# Patient Record
Sex: MALE | Race: WHITE | NOT HISPANIC OR LATINO | URBAN - NONMETROPOLITAN AREA
[De-identification: names, ages, dates, MRNs, and addresses within clinical notes are randomized per-mention and may not be internally consistent; named-entity substitution may affect disease eponyms.]

---

## 2020-06-17 ENCOUNTER — OFFICE VISIT (OUTPATIENT)
Dept: URBAN - NONMETROPOLITAN AREA CLINIC 1 | Facility: CLINIC | Age: 40
End: 2020-06-17
Payer: COMMERCIAL

## 2020-06-17 DIAGNOSIS — K50.80 CROHN'S COLITIS: ICD-10-CM

## 2020-06-17 PROCEDURE — 96413 CHEMO IV INFUSION 1 HR: CPT | Performed by: INTERNAL MEDICINE

## 2020-06-17 RX ORDER — SACCHAROMYCES BOULARDII 250 MG
CAPSULE ORAL
Qty: 0 | Refills: 0 | Status: ACTIVE | COMMUNITY
Start: 1900-01-01 | End: 1900-01-01

## 2020-06-17 RX ORDER — LISDEXAMFETAMINE DIMESYLATE 30 MG/1
CAPSULE ORAL
Qty: 0 | Refills: 0 | Status: ACTIVE | COMMUNITY
Start: 1900-01-01 | End: 1900-01-01

## 2020-06-17 RX ORDER — VILAZODONE HYDROCHLORIDE 40 MG/1
TAKE 1 TABLET (40 MG) BY ORAL ROUTE ONCE DAILY WITH FOOD TABLET ORAL 1
Qty: 0 | Refills: 0 | Status: ACTIVE | COMMUNITY
Start: 1900-01-01 | End: 1900-01-01

## 2020-08-04 ENCOUNTER — OFFICE VISIT (OUTPATIENT)
Dept: URBAN - NONMETROPOLITAN AREA CLINIC 2 | Facility: CLINIC | Age: 40
End: 2020-08-04

## 2020-08-06 ENCOUNTER — TELEPHONE ENCOUNTER (OUTPATIENT)
Dept: URBAN - NONMETROPOLITAN AREA CLINIC 2 | Facility: CLINIC | Age: 40
End: 2020-08-06

## 2020-08-06 ENCOUNTER — OFFICE VISIT (OUTPATIENT)
Dept: URBAN - METROPOLITAN AREA TELEHEALTH 2 | Facility: TELEHEALTH | Age: 40
End: 2020-08-06
Payer: COMMERCIAL

## 2020-08-06 DIAGNOSIS — K94.13 ILEOSTOMY DYSFUNCTION: ICD-10-CM

## 2020-08-06 DIAGNOSIS — K50.80 CROHN'S DISEASE OF BOTH SMALL AND LARGE INTESTINE WITHOUT COMPLICATION: ICD-10-CM

## 2020-08-06 PROCEDURE — 82306 VITAMIN D 25 HYDROXY: CPT | Performed by: NURSE PRACTITIONER

## 2020-08-06 PROCEDURE — G8427 DOCREV CUR MEDS BY ELIG CLIN: HCPCS | Performed by: NURSE PRACTITIONER

## 2020-08-06 PROCEDURE — 1036F TOBACCO NON-USER: CPT | Performed by: NURSE PRACTITIONER

## 2020-08-06 PROCEDURE — G9903 PT SCRN TBCO ID AS NON USER: HCPCS | Performed by: NURSE PRACTITIONER

## 2020-08-06 PROCEDURE — 82607 VITAMIN B-12: CPT | Performed by: NURSE PRACTITIONER

## 2020-08-06 PROCEDURE — 99213 OFFICE O/P EST LOW 20 MIN: CPT | Performed by: NURSE PRACTITIONER

## 2020-08-06 PROCEDURE — G8420 CALC BMI NORM PARAMETERS: HCPCS | Performed by: NURSE PRACTITIONER

## 2020-08-06 RX ORDER — LISDEXAMFETAMINE DIMESYLATE 30 MG/1
CAPSULE ORAL
Qty: 0 | Refills: 0 | Status: ACTIVE | COMMUNITY
Start: 1900-01-01

## 2020-08-06 RX ORDER — VILAZODONE HYDROCHLORIDE 40 MG/1
TAKE 1 TABLET (40 MG) BY ORAL ROUTE ONCE DAILY WITH FOOD TABLET ORAL 1
Qty: 0 | Refills: 0 | Status: ACTIVE | COMMUNITY
Start: 1900-01-01

## 2020-08-06 RX ORDER — NYSTATIN 100000 [USP'U]/G
1 APPLICATION POWDER TOPICAL TWICE A DAY
Qty: 480 GM | Refills: 2 | OUTPATIENT
Start: 2020-08-06 | End: 2020-09-17

## 2020-08-06 RX ORDER — SACCHAROMYCES BOULARDII 250 MG
CAPSULE ORAL
Qty: 0 | Refills: 0 | Status: ACTIVE | COMMUNITY
Start: 1900-01-01

## 2020-08-06 NOTE — HPI-TODAY'S VISIT:
8/5/2020 Kwesi presents for follow up of Crohn's disease s/p colectomy with end ileostomy. His output is good with no bleeding or pain. He is tolerating his entivyo Q 8 weeks. 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 Dr. Jurado has recommended a binder. He is loosing seal on his ostomy and has a rash at the stoma. He is due for lab work. Today he is doing well. MB   2/4/20 Mr. Finch presents for f/u of his Crohn's disease.  he is maintained on Entyvio and doing well.  His only complaint today is that his ostomy seems to be herniating again.  he has not followed up with Dr. Jurado.  He has not had any flares of his Crohn's.  OVerall, he is doing well today.  9/16/19 Kwesi presents for follow up of Crohn's disease s/p colectomy with end ilostomy. He recently fell while skating and developed a parastomal hernia. He had to have emergency surgery by Dr. Jurado. He is doing well postop with no complications, good output. He did loose weight during this and would like to meet with jian. Today he is doing well otherwise with no new GI complaints. MB

## 2020-08-19 ENCOUNTER — OFFICE VISIT (OUTPATIENT)
Dept: URBAN - NONMETROPOLITAN AREA CLINIC 1 | Facility: CLINIC | Age: 40
End: 2020-08-19
Payer: COMMERCIAL

## 2020-08-19 DIAGNOSIS — K50.80 CROHN'S COLITIS: ICD-10-CM

## 2020-08-19 PROCEDURE — 96413 CHEMO IV INFUSION 1 HR: CPT | Performed by: INTERNAL MEDICINE

## 2020-08-19 RX ORDER — VILAZODONE HYDROCHLORIDE 40 MG/1
TAKE 1 TABLET (40 MG) BY ORAL ROUTE ONCE DAILY WITH FOOD TABLET ORAL 1
Qty: 0 | Refills: 0 | Status: ACTIVE | COMMUNITY
Start: 1900-01-01

## 2020-08-19 RX ORDER — NYSTATIN 100000 [USP'U]/G
1 APPLICATION POWDER TOPICAL TWICE A DAY
Qty: 480 GM | Refills: 2 | Status: ACTIVE | COMMUNITY
Start: 2020-08-06 | End: 2020-09-17

## 2020-08-19 RX ORDER — LISDEXAMFETAMINE DIMESYLATE 30 MG/1
CAPSULE ORAL
Qty: 0 | Refills: 0 | Status: ACTIVE | COMMUNITY
Start: 1900-01-01

## 2020-08-19 RX ORDER — SACCHAROMYCES BOULARDII 250 MG
CAPSULE ORAL
Qty: 0 | Refills: 0 | Status: ACTIVE | COMMUNITY
Start: 1900-01-01

## 2020-09-15 ENCOUNTER — TELEPHONE ENCOUNTER (OUTPATIENT)
Dept: URBAN - NONMETROPOLITAN AREA CLINIC 1 | Facility: CLINIC | Age: 40
End: 2020-09-15

## 2020-09-15 RX ORDER — VEDOLIZUMAB 300 MG/5ML
AS DIRECTED INJECTION, POWDER, LYOPHILIZED, FOR SOLUTION INTRAVENOUS
Qty: 1 | Refills: 0 | OUTPATIENT
Start: 2020-09-17 | End: 2020-09-18

## 2020-09-22 ENCOUNTER — LAB OUTSIDE AN ENCOUNTER (OUTPATIENT)
Dept: URBAN - NONMETROPOLITAN AREA CLINIC 1 | Facility: CLINIC | Age: 40
End: 2020-09-22

## 2020-10-07 ENCOUNTER — OFFICE VISIT (OUTPATIENT)
Dept: URBAN - NONMETROPOLITAN AREA CLINIC 1 | Facility: CLINIC | Age: 40
End: 2020-10-07
Payer: COMMERCIAL

## 2020-10-07 DIAGNOSIS — K50.80 CROHN'S COLITIS: ICD-10-CM

## 2020-10-07 PROCEDURE — 96413 CHEMO IV INFUSION 1 HR: CPT | Performed by: INTERNAL MEDICINE

## 2020-10-07 RX ORDER — VILAZODONE HYDROCHLORIDE 40 MG/1
TAKE 1 TABLET (40 MG) BY ORAL ROUTE ONCE DAILY WITH FOOD TABLET ORAL 1
Qty: 0 | Refills: 0 | Status: ACTIVE | COMMUNITY
Start: 1900-01-01

## 2020-10-07 RX ORDER — SACCHAROMYCES BOULARDII 250 MG
CAPSULE ORAL
Qty: 0 | Refills: 0 | Status: ACTIVE | COMMUNITY
Start: 1900-01-01

## 2020-10-07 RX ORDER — LISDEXAMFETAMINE DIMESYLATE 30 MG/1
CAPSULE ORAL
Qty: 0 | Refills: 0 | Status: ACTIVE | COMMUNITY
Start: 1900-01-01

## 2020-11-06 ENCOUNTER — OFFICE VISIT (OUTPATIENT)
Dept: URBAN - NONMETROPOLITAN AREA CLINIC 13 | Facility: CLINIC | Age: 40
End: 2020-11-06

## 2020-12-02 ENCOUNTER — CLAIMS CREATED FROM THE CLAIM WINDOW (OUTPATIENT)
Dept: URBAN - NONMETROPOLITAN AREA CLINIC 1 | Facility: CLINIC | Age: 40
End: 2020-12-02
Payer: COMMERCIAL

## 2020-12-02 DIAGNOSIS — K50.80 CROHN'S COLITIS: ICD-10-CM

## 2020-12-02 PROCEDURE — 96413 CHEMO IV INFUSION 1 HR: CPT | Performed by: INTERNAL MEDICINE

## 2020-12-02 RX ORDER — SACCHAROMYCES BOULARDII 250 MG
CAPSULE ORAL
Qty: 0 | Refills: 0 | Status: ACTIVE | COMMUNITY
Start: 1900-01-01

## 2020-12-02 RX ORDER — VILAZODONE HYDROCHLORIDE 40 MG/1
TAKE 1 TABLET (40 MG) BY ORAL ROUTE ONCE DAILY WITH FOOD TABLET ORAL 1
Qty: 0 | Refills: 0 | Status: ACTIVE | COMMUNITY
Start: 1900-01-01

## 2020-12-02 RX ORDER — LISDEXAMFETAMINE DIMESYLATE 30 MG/1
CAPSULE ORAL
Qty: 0 | Refills: 0 | Status: ACTIVE | COMMUNITY
Start: 1900-01-01

## 2021-01-27 ENCOUNTER — OFFICE VISIT (OUTPATIENT)
Dept: URBAN - NONMETROPOLITAN AREA CLINIC 1 | Facility: CLINIC | Age: 41
End: 2021-01-27

## 2021-01-27 RX ORDER — LISDEXAMFETAMINE DIMESYLATE 30 MG/1
CAPSULE ORAL
Qty: 0 | Refills: 0 | Status: ACTIVE | COMMUNITY
Start: 1900-01-01

## 2021-01-27 RX ORDER — SACCHAROMYCES BOULARDII 250 MG
CAPSULE ORAL
Qty: 0 | Refills: 0 | Status: ACTIVE | COMMUNITY
Start: 1900-01-01

## 2021-01-27 RX ORDER — VILAZODONE HYDROCHLORIDE 40 MG/1
TAKE 1 TABLET (40 MG) BY ORAL ROUTE ONCE DAILY WITH FOOD TABLET ORAL 1
Qty: 0 | Refills: 0 | Status: ACTIVE | COMMUNITY
Start: 1900-01-01

## 2021-03-24 ENCOUNTER — OFFICE VISIT (OUTPATIENT)
Dept: URBAN - NONMETROPOLITAN AREA CLINIC 1 | Facility: CLINIC | Age: 41
End: 2021-03-24

## 2021-03-24 RX ORDER — SACCHAROMYCES BOULARDII 250 MG
CAPSULE ORAL
Qty: 0 | Refills: 0 | Status: ACTIVE | COMMUNITY
Start: 1900-01-01

## 2021-03-24 RX ORDER — LISDEXAMFETAMINE DIMESYLATE 30 MG/1
CAPSULE ORAL
Qty: 0 | Refills: 0 | Status: ACTIVE | COMMUNITY
Start: 1900-01-01

## 2021-03-24 RX ORDER — VILAZODONE HYDROCHLORIDE 40 MG/1
TAKE 1 TABLET (40 MG) BY ORAL ROUTE ONCE DAILY WITH FOOD TABLET ORAL 1
Qty: 0 | Refills: 0 | Status: ACTIVE | COMMUNITY
Start: 1900-01-01

## 2021-05-04 ENCOUNTER — OFFICE VISIT (OUTPATIENT)
Dept: URBAN - NONMETROPOLITAN AREA CLINIC 1 | Facility: CLINIC | Age: 41
End: 2021-05-04
Payer: COMMERCIAL

## 2021-05-04 DIAGNOSIS — K50.80 CROHN'S COLITIS: ICD-10-CM

## 2021-05-04 PROCEDURE — 96365 THER/PROPH/DIAG IV INF INIT: CPT | Performed by: INTERNAL MEDICINE

## 2021-05-04 RX ORDER — SACCHAROMYCES BOULARDII 250 MG
CAPSULE ORAL
Qty: 0 | Refills: 0 | Status: ACTIVE | COMMUNITY
Start: 1900-01-01

## 2021-05-04 RX ORDER — VILAZODONE HYDROCHLORIDE 40 MG/1
TAKE 1 TABLET (40 MG) BY ORAL ROUTE ONCE DAILY WITH FOOD TABLET ORAL 1
Qty: 0 | Refills: 0 | Status: ACTIVE | COMMUNITY
Start: 1900-01-01

## 2021-05-04 RX ORDER — LISDEXAMFETAMINE DIMESYLATE 30 MG/1
CAPSULE ORAL
Qty: 0 | Refills: 0 | Status: ACTIVE | COMMUNITY
Start: 1900-01-01

## 2021-06-30 ENCOUNTER — OFFICE VISIT (OUTPATIENT)
Dept: URBAN - NONMETROPOLITAN AREA CLINIC 1 | Facility: CLINIC | Age: 41
End: 2021-06-30

## 2021-07-06 ENCOUNTER — OFFICE VISIT (OUTPATIENT)
Dept: URBAN - NONMETROPOLITAN AREA CLINIC 1 | Facility: CLINIC | Age: 41
End: 2021-07-06
Payer: COMMERCIAL

## 2021-07-06 DIAGNOSIS — K50.80 CROHN'S COLITIS: ICD-10-CM

## 2021-07-06 PROCEDURE — 96365 THER/PROPH/DIAG IV INF INIT: CPT | Performed by: INTERNAL MEDICINE

## 2021-07-06 RX ORDER — LISDEXAMFETAMINE DIMESYLATE 30 MG/1
CAPSULE ORAL
Qty: 0 | Refills: 0 | Status: ACTIVE | COMMUNITY
Start: 1900-01-01

## 2021-07-06 RX ORDER — VILAZODONE HYDROCHLORIDE 40 MG/1
TAKE 1 TABLET (40 MG) BY ORAL ROUTE ONCE DAILY WITH FOOD TABLET ORAL 1
Qty: 0 | Refills: 0 | Status: ACTIVE | COMMUNITY
Start: 1900-01-01

## 2021-07-06 RX ORDER — SACCHAROMYCES BOULARDII 250 MG
CAPSULE ORAL
Qty: 0 | Refills: 0 | Status: ACTIVE | COMMUNITY
Start: 1900-01-01

## 2021-08-23 ENCOUNTER — ERX REFILL RESPONSE (OUTPATIENT)
Dept: URBAN - NONMETROPOLITAN AREA CLINIC 2 | Facility: CLINIC | Age: 41
End: 2021-08-23

## 2021-08-23 RX ORDER — NYSTATIN 100000 [USP'U]/G
APPLY TO AFFECTED AREA TWICE DAILY FOR 14DAYS POWDER TOPICAL
Qty: 480 GRAM | Refills: 0 | OUTPATIENT

## 2021-08-23 RX ORDER — NYSTATIN 100000 [USP'U]/G
APPLY TO AFFECTED AREA TWICE DAILY FOR 14DAYS POWDER TOPICAL
Qty: 480 GRAM | Refills: 1 | OUTPATIENT

## 2021-08-31 ENCOUNTER — OFFICE VISIT (OUTPATIENT)
Dept: URBAN - NONMETROPOLITAN AREA CLINIC 1 | Facility: CLINIC | Age: 41
End: 2021-08-31
Payer: COMMERCIAL

## 2021-08-31 DIAGNOSIS — K50.80 CROHN'S COLITIS: ICD-10-CM

## 2021-08-31 PROCEDURE — 96413 CHEMO IV INFUSION 1 HR: CPT | Performed by: INTERNAL MEDICINE

## 2021-08-31 RX ORDER — LISDEXAMFETAMINE DIMESYLATE 30 MG/1
CAPSULE ORAL
Qty: 0 | Refills: 0 | Status: ACTIVE | COMMUNITY
Start: 1900-01-01

## 2021-08-31 RX ORDER — NYSTATIN 100000 [USP'U]/G
APPLY TO AFFECTED AREA TWICE DAILY FOR 14DAYS POWDER TOPICAL
Qty: 480 GRAM | Refills: 1 | Status: ACTIVE | COMMUNITY

## 2021-08-31 RX ORDER — SACCHAROMYCES BOULARDII 250 MG
CAPSULE ORAL
Qty: 0 | Refills: 0 | Status: ACTIVE | COMMUNITY
Start: 1900-01-01

## 2021-08-31 RX ORDER — VILAZODONE HYDROCHLORIDE 40 MG/1
TAKE 1 TABLET (40 MG) BY ORAL ROUTE ONCE DAILY WITH FOOD TABLET ORAL 1
Qty: 0 | Refills: 0 | Status: ACTIVE | COMMUNITY
Start: 1900-01-01

## 2021-09-02 ENCOUNTER — OFFICE VISIT (OUTPATIENT)
Dept: URBAN - NONMETROPOLITAN AREA CLINIC 2 | Facility: CLINIC | Age: 41
End: 2021-09-02

## 2021-10-26 ENCOUNTER — OFFICE VISIT (OUTPATIENT)
Dept: URBAN - NONMETROPOLITAN AREA CLINIC 1 | Facility: CLINIC | Age: 41
End: 2021-10-26
Payer: COMMERCIAL

## 2021-10-26 DIAGNOSIS — K50.80 CROHN'S COLITIS: ICD-10-CM

## 2021-10-26 PROCEDURE — 96413 CHEMO IV INFUSION 1 HR: CPT | Performed by: INTERNAL MEDICINE

## 2021-10-26 RX ORDER — VILAZODONE HYDROCHLORIDE 40 MG/1
TAKE 1 TABLET (40 MG) BY ORAL ROUTE ONCE DAILY WITH FOOD TABLET ORAL 1
Qty: 0 | Refills: 0 | Status: ACTIVE | COMMUNITY
Start: 1900-01-01

## 2021-10-26 RX ORDER — NYSTATIN 100000 [USP'U]/G
APPLY TO AFFECTED AREA TWICE DAILY FOR 14DAYS POWDER TOPICAL
Qty: 480 GRAM | Refills: 1 | Status: ACTIVE | COMMUNITY

## 2021-10-26 RX ORDER — SACCHAROMYCES BOULARDII 250 MG
CAPSULE ORAL
Qty: 0 | Refills: 0 | Status: ACTIVE | COMMUNITY
Start: 1900-01-01

## 2021-10-26 RX ORDER — LISDEXAMFETAMINE DIMESYLATE 30 MG/1
CAPSULE ORAL
Qty: 0 | Refills: 0 | Status: ACTIVE | COMMUNITY
Start: 1900-01-01

## 2021-11-19 ENCOUNTER — TELEPHONE ENCOUNTER (OUTPATIENT)
Dept: URBAN - NONMETROPOLITAN AREA CLINIC 2 | Facility: CLINIC | Age: 41
End: 2021-11-19

## 2022-03-08 ENCOUNTER — TELEPHONE ENCOUNTER (OUTPATIENT)
Dept: URBAN - NONMETROPOLITAN AREA CLINIC 1 | Facility: CLINIC | Age: 42
End: 2022-03-08

## 2022-03-08 ENCOUNTER — TELEPHONE ENCOUNTER (OUTPATIENT)
Dept: URBAN - NONMETROPOLITAN AREA CLINIC 2 | Facility: CLINIC | Age: 42
End: 2022-03-08

## 2022-03-08 RX ORDER — DIPHENHYDRAMINE HCL 2 %
1 CAPSULE AT BEDTIME AS NEEDED CREAM (GRAM) TOPICAL ONCE A DAY
Qty: 30 CAPSULE | Refills: 0 | OUTPATIENT
Start: 2022-03-08 | End: 2022-04-07

## 2022-03-08 RX ORDER — VEDOLIZUMAB 300 MG/5ML
AS DIRECTED INJECTION, POWDER, LYOPHILIZED, FOR SOLUTION INTRAVENOUS
Qty: 300 MILLIGRAMS | Refills: 0 | OUTPATIENT
Start: 2022-03-08 | End: 2022-06-06

## 2022-03-08 RX ORDER — ACETAMINOPHEN 650 MG
2 TABLETS AS NEEDED TABLET, EXTENDED RELEASE ORAL
Qty: 6 TABLET | Refills: 0 | OUTPATIENT
Start: 2022-03-08 | End: 2022-03-09

## 2022-03-09 ENCOUNTER — OFFICE VISIT (OUTPATIENT)
Dept: URBAN - NONMETROPOLITAN AREA CLINIC 1 | Facility: CLINIC | Age: 42
End: 2022-03-09
Payer: COMMERCIAL

## 2022-03-09 DIAGNOSIS — K50.80 CROHN'S COLITIS: ICD-10-CM

## 2022-03-09 PROCEDURE — 96413 CHEMO IV INFUSION 1 HR: CPT | Performed by: INTERNAL MEDICINE

## 2022-03-09 RX ORDER — VEDOLIZUMAB 300 MG/5ML
AS DIRECTED INJECTION, POWDER, LYOPHILIZED, FOR SOLUTION INTRAVENOUS
Qty: 300 MILLIGRAMS | Refills: 0 | Status: ACTIVE | COMMUNITY
Start: 2022-03-08 | End: 2022-06-06

## 2022-03-09 RX ORDER — DIPHENHYDRAMINE HCL 2 %
1 CAPSULE AT BEDTIME AS NEEDED CREAM (GRAM) TOPICAL ONCE A DAY
Qty: 30 CAPSULE | Refills: 0 | Status: ACTIVE | COMMUNITY
Start: 2022-03-08 | End: 2022-04-07

## 2022-03-09 RX ORDER — LISDEXAMFETAMINE DIMESYLATE 30 MG/1
CAPSULE ORAL
Qty: 0 | Refills: 0 | Status: ACTIVE | COMMUNITY
Start: 1900-01-01

## 2022-03-09 RX ORDER — ACETAMINOPHEN 650 MG
2 TABLETS AS NEEDED TABLET, EXTENDED RELEASE ORAL
Qty: 6 TABLET | Refills: 0 | Status: ACTIVE | COMMUNITY
Start: 2022-03-08 | End: 2022-03-09

## 2022-03-09 RX ORDER — NYSTATIN 100000 [USP'U]/G
APPLY TO AFFECTED AREA TWICE DAILY FOR 14DAYS POWDER TOPICAL
Qty: 480 GRAM | Refills: 1 | Status: ACTIVE | COMMUNITY

## 2022-03-09 RX ORDER — VILAZODONE HYDROCHLORIDE 40 MG/1
TAKE 1 TABLET (40 MG) BY ORAL ROUTE ONCE DAILY WITH FOOD TABLET ORAL 1
Qty: 0 | Refills: 0 | Status: ACTIVE | COMMUNITY
Start: 1900-01-01

## 2022-03-09 RX ORDER — SACCHAROMYCES BOULARDII 250 MG
CAPSULE ORAL
Qty: 0 | Refills: 0 | Status: ACTIVE | COMMUNITY
Start: 1900-01-01

## 2022-03-12 LAB
HBSAG SCREEN: NEGATIVE
HCV AB: 0.3
HEP A AB, IGM: NEGATIVE
HEP B CORE AB, IGM: NEGATIVE
INTERPRETATION:: (no result)
QUANTIFERON CRITERIA: (no result)
QUANTIFERON INCUBATION: (no result)
QUANTIFERON MITOGEN VALUE: >10
QUANTIFERON NIL VALUE: 0.02
QUANTIFERON TB1 AG VALUE: 0.03
QUANTIFERON TB2 AG VALUE: 0.03
QUANTIFERON-TB GOLD PLUS: NEGATIVE

## 2022-03-15 ENCOUNTER — TELEPHONE ENCOUNTER (OUTPATIENT)
Dept: URBAN - METROPOLITAN AREA CLINIC 92 | Facility: CLINIC | Age: 42
End: 2022-03-15

## 2022-05-04 ENCOUNTER — OFFICE VISIT (OUTPATIENT)
Dept: URBAN - NONMETROPOLITAN AREA CLINIC 1 | Facility: CLINIC | Age: 42
End: 2022-05-04
Payer: COMMERCIAL

## 2022-05-04 DIAGNOSIS — K50.80 CROHN'S COLITIS: ICD-10-CM

## 2022-05-04 PROCEDURE — 96413 CHEMO IV INFUSION 1 HR: CPT | Performed by: INTERNAL MEDICINE

## 2022-05-04 RX ORDER — NYSTATIN 100000 [USP'U]/G
APPLY TO AFFECTED AREA TWICE DAILY FOR 14DAYS POWDER TOPICAL
Qty: 480 GRAM | Refills: 1 | Status: ACTIVE | COMMUNITY

## 2022-05-04 RX ORDER — SACCHAROMYCES BOULARDII 250 MG
CAPSULE ORAL
Qty: 0 | Refills: 0 | Status: ACTIVE | COMMUNITY
Start: 1900-01-01

## 2022-05-04 RX ORDER — LISDEXAMFETAMINE DIMESYLATE 30 MG/1
CAPSULE ORAL
Qty: 0 | Refills: 0 | Status: ACTIVE | COMMUNITY
Start: 1900-01-01

## 2022-05-04 RX ORDER — VILAZODONE HYDROCHLORIDE 40 MG/1
TAKE 1 TABLET (40 MG) BY ORAL ROUTE ONCE DAILY WITH FOOD TABLET ORAL 1
Qty: 0 | Refills: 0 | Status: ACTIVE | COMMUNITY
Start: 1900-01-01

## 2022-05-04 RX ORDER — VEDOLIZUMAB 300 MG/5ML
AS DIRECTED INJECTION, POWDER, LYOPHILIZED, FOR SOLUTION INTRAVENOUS
Qty: 300 MILLIGRAMS | Refills: 0 | Status: ACTIVE | COMMUNITY
Start: 2022-03-08 | End: 2022-06-06

## 2022-07-05 ENCOUNTER — OFFICE VISIT (OUTPATIENT)
Dept: URBAN - NONMETROPOLITAN AREA CLINIC 1 | Facility: CLINIC | Age: 42
End: 2022-07-05
Payer: COMMERCIAL

## 2022-07-05 VITALS
DIASTOLIC BLOOD PRESSURE: 76 MMHG | SYSTOLIC BLOOD PRESSURE: 110 MMHG | HEIGHT: 64 IN | BODY MASS INDEX: 21.07 KG/M2 | WEIGHT: 123.4 LBS

## 2022-07-05 DIAGNOSIS — K50.80 CROHN'S COLITIS: ICD-10-CM

## 2022-07-05 PROCEDURE — 96413 CHEMO IV INFUSION 1 HR: CPT | Performed by: INTERNAL MEDICINE

## 2022-07-05 RX ORDER — LISDEXAMFETAMINE DIMESYLATE 30 MG/1
CAPSULE ORAL
Qty: 0 | Refills: 0 | Status: ACTIVE | COMMUNITY
Start: 1900-01-01

## 2022-07-05 RX ORDER — NYSTATIN 100000 [USP'U]/G
APPLY TO AFFECTED AREA TWICE DAILY FOR 14DAYS POWDER TOPICAL
Qty: 480 GRAM | Refills: 1 | Status: ACTIVE | COMMUNITY

## 2022-07-05 RX ORDER — VILAZODONE HYDROCHLORIDE 40 MG/1
TAKE 1 TABLET (40 MG) BY ORAL ROUTE ONCE DAILY WITH FOOD TABLET ORAL 1
Qty: 0 | Refills: 0 | Status: ACTIVE | COMMUNITY
Start: 1900-01-01

## 2022-07-05 RX ORDER — SACCHAROMYCES BOULARDII 250 MG
CAPSULE ORAL
Qty: 0 | Refills: 0 | Status: ACTIVE | COMMUNITY
Start: 1900-01-01

## 2022-08-24 ENCOUNTER — OFFICE VISIT (OUTPATIENT)
Dept: URBAN - NONMETROPOLITAN AREA CLINIC 1 | Facility: CLINIC | Age: 42
End: 2022-08-24

## 2022-08-24 RX ORDER — SACCHAROMYCES BOULARDII 250 MG
CAPSULE ORAL
Qty: 0 | Refills: 0 | Status: ACTIVE | COMMUNITY
Start: 1900-01-01

## 2022-08-24 RX ORDER — NYSTATIN 100000 [USP'U]/G
APPLY TO AFFECTED AREA TWICE DAILY FOR 14DAYS POWDER TOPICAL
Qty: 480 GRAM | Refills: 1 | Status: ACTIVE | COMMUNITY

## 2022-08-24 RX ORDER — VILAZODONE HYDROCHLORIDE 40 MG/1
TAKE 1 TABLET (40 MG) BY ORAL ROUTE ONCE DAILY WITH FOOD TABLET ORAL 1
Qty: 0 | Refills: 0 | Status: ACTIVE | COMMUNITY
Start: 1900-01-01

## 2022-08-24 RX ORDER — LISDEXAMFETAMINE DIMESYLATE 30 MG/1
CAPSULE ORAL
Qty: 0 | Refills: 0 | Status: ACTIVE | COMMUNITY
Start: 1900-01-01

## 2022-08-25 ENCOUNTER — TELEPHONE ENCOUNTER (OUTPATIENT)
Dept: URBAN - NONMETROPOLITAN AREA CLINIC 1 | Facility: CLINIC | Age: 42
End: 2022-08-25

## 2022-08-25 ENCOUNTER — TELEPHONE ENCOUNTER (OUTPATIENT)
Dept: URBAN - METROPOLITAN AREA CLINIC 97 | Facility: CLINIC | Age: 42
End: 2022-08-25

## 2022-09-06 ENCOUNTER — OFFICE VISIT (OUTPATIENT)
Dept: URBAN - NONMETROPOLITAN AREA CLINIC 1 | Facility: CLINIC | Age: 42
End: 2022-09-06
Payer: COMMERCIAL

## 2022-09-06 VITALS
SYSTOLIC BLOOD PRESSURE: 112 MMHG | WEIGHT: 124.6 LBS | BODY MASS INDEX: 21.27 KG/M2 | HEIGHT: 64 IN | DIASTOLIC BLOOD PRESSURE: 76 MMHG

## 2022-09-06 DIAGNOSIS — K50.80 CROHN'S COLITIS: ICD-10-CM

## 2022-09-06 PROCEDURE — 96413 CHEMO IV INFUSION 1 HR: CPT | Performed by: INTERNAL MEDICINE

## 2022-09-06 RX ORDER — LISDEXAMFETAMINE DIMESYLATE 30 MG/1
CAPSULE ORAL
Qty: 0 | Refills: 0 | Status: ACTIVE | COMMUNITY
Start: 1900-01-01

## 2022-09-06 RX ORDER — VILAZODONE HYDROCHLORIDE 40 MG/1
TAKE 1 TABLET (40 MG) BY ORAL ROUTE ONCE DAILY WITH FOOD TABLET ORAL 1
Qty: 0 | Refills: 0 | Status: ACTIVE | COMMUNITY
Start: 1900-01-01

## 2022-09-06 RX ORDER — SACCHAROMYCES BOULARDII 250 MG
CAPSULE ORAL
Qty: 0 | Refills: 0 | Status: ACTIVE | COMMUNITY
Start: 1900-01-01

## 2022-09-06 RX ORDER — NYSTATIN 100000 [USP'U]/G
APPLY TO AFFECTED AREA TWICE DAILY FOR 14DAYS POWDER TOPICAL
Qty: 480 GRAM | Refills: 1 | Status: ACTIVE | COMMUNITY

## 2022-10-19 ENCOUNTER — CLAIMS CREATED FROM THE CLAIM WINDOW (OUTPATIENT)
Dept: URBAN - NONMETROPOLITAN AREA CLINIC 1 | Facility: CLINIC | Age: 42
End: 2022-10-19
Payer: COMMERCIAL

## 2022-10-19 VITALS
HEIGHT: 64 IN | DIASTOLIC BLOOD PRESSURE: 76 MMHG | SYSTOLIC BLOOD PRESSURE: 134 MMHG | WEIGHT: 123.8 LBS | BODY MASS INDEX: 21.13 KG/M2

## 2022-10-19 DIAGNOSIS — K50.80 CROHN'S COLITIS: ICD-10-CM

## 2022-10-19 PROCEDURE — 96413 CHEMO IV INFUSION 1 HR: CPT | Performed by: INTERNAL MEDICINE

## 2022-10-19 RX ORDER — LISDEXAMFETAMINE DIMESYLATE 30 MG/1
CAPSULE ORAL
Qty: 0 | Refills: 0 | Status: ACTIVE | COMMUNITY
Start: 1900-01-01

## 2022-10-19 RX ORDER — SACCHAROMYCES BOULARDII 250 MG
CAPSULE ORAL
Qty: 0 | Refills: 0 | Status: ACTIVE | COMMUNITY
Start: 1900-01-01

## 2022-10-19 RX ORDER — NYSTATIN 100000 [USP'U]/G
APPLY TO AFFECTED AREA TWICE DAILY FOR 14DAYS POWDER TOPICAL
Qty: 480 GRAM | Refills: 1 | Status: ACTIVE | COMMUNITY

## 2022-10-19 RX ORDER — VILAZODONE HYDROCHLORIDE 40 MG/1
TAKE 1 TABLET (40 MG) BY ORAL ROUTE ONCE DAILY WITH FOOD TABLET ORAL 1
Qty: 0 | Refills: 0 | Status: ACTIVE | COMMUNITY
Start: 1900-01-01

## 2022-12-14 ENCOUNTER — OFFICE VISIT (OUTPATIENT)
Dept: URBAN - NONMETROPOLITAN AREA CLINIC 1 | Facility: CLINIC | Age: 42
End: 2022-12-14
Payer: COMMERCIAL

## 2022-12-14 VITALS
WEIGHT: 124.2 LBS | DIASTOLIC BLOOD PRESSURE: 71 MMHG | BODY MASS INDEX: 21.21 KG/M2 | SYSTOLIC BLOOD PRESSURE: 104 MMHG | HEIGHT: 64 IN

## 2022-12-14 DIAGNOSIS — K50.80 CROHN'S COLITIS: ICD-10-CM

## 2022-12-14 PROCEDURE — 96413 CHEMO IV INFUSION 1 HR: CPT | Performed by: INTERNAL MEDICINE

## 2022-12-14 RX ORDER — VILAZODONE HYDROCHLORIDE 40 MG/1
TAKE 1 TABLET (40 MG) BY ORAL ROUTE ONCE DAILY WITH FOOD TABLET ORAL 1
Qty: 0 | Refills: 0 | Status: ACTIVE | COMMUNITY
Start: 1900-01-01

## 2022-12-14 RX ORDER — SACCHAROMYCES BOULARDII 250 MG
CAPSULE ORAL
Qty: 0 | Refills: 0 | Status: ACTIVE | COMMUNITY
Start: 1900-01-01

## 2022-12-14 RX ORDER — LISDEXAMFETAMINE DIMESYLATE 30 MG/1
CAPSULE ORAL
Qty: 0 | Refills: 0 | Status: ACTIVE | COMMUNITY
Start: 1900-01-01

## 2022-12-14 RX ORDER — NYSTATIN 100000 [USP'U]/G
APPLY TO AFFECTED AREA TWICE DAILY FOR 14DAYS POWDER TOPICAL
Qty: 480 GRAM | Refills: 1 | Status: ACTIVE | COMMUNITY

## 2023-01-12 ENCOUNTER — TELEPHONE ENCOUNTER (OUTPATIENT)
Dept: URBAN - NONMETROPOLITAN AREA CLINIC 2 | Facility: CLINIC | Age: 43
End: 2023-01-12

## 2023-01-12 RX ORDER — VILAZODONE HYDROCHLORIDE 40 MG/1
TAKE 1 TABLET (40 MG) BY ORAL ROUTE ONCE DAILY WITH FOOD TABLET ORAL 1
Qty: 0 | Refills: 0 | Status: ACTIVE | COMMUNITY
Start: 1900-01-01

## 2023-01-12 RX ORDER — SACCHAROMYCES BOULARDII 250 MG
CAPSULE ORAL
Qty: 0 | Refills: 0 | Status: ACTIVE | COMMUNITY
Start: 1900-01-01

## 2023-01-12 RX ORDER — LISDEXAMFETAMINE DIMESYLATE 30 MG/1
CAPSULE ORAL
Qty: 0 | Refills: 0 | Status: ACTIVE | COMMUNITY
Start: 1900-01-01

## 2023-01-12 RX ORDER — NYSTATIN 100000 [USP'U]/G
APPLY TO AFFECTED AREA TWICE DAILY FOR 14DAYS POWDER TOPICAL
Qty: 480 GRAM | Refills: 1 | Status: ACTIVE | COMMUNITY

## 2023-01-12 RX ORDER — RIFAXIMIN 550 MG/1
1 TABLET TABLET ORAL THREE TIMES A DAY
Qty: 42 TABLET | Refills: 2 | OUTPATIENT
Start: 2023-01-12 | End: 2023-02-23

## 2023-01-16 ENCOUNTER — P2P PATIENT RECORD (OUTPATIENT)
Age: 43
End: 2023-01-16

## 2023-01-16 LAB
A/G RATIO: 1.8
ABSOLUTE BASOPHILS: 112
ABSOLUTE EOSINOPHILS: 1106
ABSOLUTE LYMPHOCYTES: 2590
ABSOLUTE MONOCYTES: 413
ABSOLUTE NEUTROPHILS: 2779
ALBUMIN: 4.4
ALKALINE PHOSPHATASE: 63
ALT (SGPT): 38
AST (SGOT): 32
BASOPHILS: 1.6
BILIRUBIN, TOTAL: 1.5
BUN/CREATININE RATIO: (no result)
BUN: 8
C-REACTIVE PROTEIN, QUANT: 2.6
CALCIUM: 9.7
CARBON DIOXIDE, TOTAL: 34
CHLORIDE: 100
CREATININE: 0.92
EGFR: 105
EOSINOPHILS: 15.8
GLOBULIN, TOTAL: 2.5
GLUCOSE: 87
HEMATOCRIT: 43.8
HEMOGLOBIN: 15.2
LYMPHOCYTES: 37
MCH: 29.9
MCHC: 34.7
MCV: 86.1
MONOCYTES: 5.9
MPV: 10.1
NEUTROPHILS: 39.7
PLATELET COUNT: 292
POTASSIUM: 4.5
PROTEIN, TOTAL: 6.9
RDW: 12.5
RED BLOOD CELL COUNT: 5.09
SED RATE BY MODIFIED: 2
SODIUM: 141
WHITE BLOOD CELL COUNT: 7

## 2023-01-17 ENCOUNTER — TELEPHONE ENCOUNTER (OUTPATIENT)
Dept: URBAN - NONMETROPOLITAN AREA CLINIC 2 | Facility: CLINIC | Age: 43
End: 2023-01-17

## 2023-01-17 ENCOUNTER — OFFICE VISIT (OUTPATIENT)
Dept: URBAN - NONMETROPOLITAN AREA CLINIC 2 | Facility: CLINIC | Age: 43
End: 2023-01-17

## 2023-01-17 RX ORDER — VILAZODONE HYDROCHLORIDE 40 MG/1
TAKE 1 TABLET (40 MG) BY ORAL ROUTE ONCE DAILY WITH FOOD TABLET ORAL 1
Qty: 0 | Refills: 0 | Status: ACTIVE | COMMUNITY
Start: 1900-01-01

## 2023-01-17 RX ORDER — SACCHAROMYCES BOULARDII 250 MG
CAPSULE ORAL
Qty: 0 | Refills: 0 | Status: ACTIVE | COMMUNITY
Start: 1900-01-01

## 2023-01-17 RX ORDER — LISDEXAMFETAMINE DIMESYLATE 30 MG/1
CAPSULE ORAL
Qty: 0 | Refills: 0 | Status: ACTIVE | COMMUNITY
Start: 1900-01-01

## 2023-01-17 RX ORDER — NYSTATIN 100000 [USP'U]/G
APPLY TO AFFECTED AREA TWICE DAILY FOR 14DAYS POWDER TOPICAL
Qty: 480 GRAM | Refills: 1 | Status: ACTIVE | COMMUNITY

## 2023-01-17 RX ORDER — RIFAXIMIN 550 MG/1
1 TABLET TABLET ORAL THREE TIMES A DAY
Qty: 42 TABLET | Refills: 2 | Status: ACTIVE | COMMUNITY
Start: 2023-01-12 | End: 2023-02-23

## 2023-01-20 LAB
CALPROTECTIN, FECAL: <5
CAMPYLOBACTER SPP. AG,EIA: (no result)
CLOSTRIDIUM DIFFICILE: (no result)
GIARDIA AG, EIA, STOOL: (no result)
LEUKOCYTES: (no result)
OVA AND PARASITES, CONC AND PERM SMEAR: (no result)
SALMONELLA AND SHIGELLA, CULTURE: (no result)
SHIGA TOXINS, EIA W/RFL TO E.COLI O157 CULTURE: (no result)

## 2023-02-08 ENCOUNTER — OFFICE VISIT (OUTPATIENT)
Dept: URBAN - NONMETROPOLITAN AREA CLINIC 1 | Facility: CLINIC | Age: 43
End: 2023-02-08
Payer: COMMERCIAL

## 2023-02-08 VITALS
HEIGHT: 64 IN | DIASTOLIC BLOOD PRESSURE: 81 MMHG | SYSTOLIC BLOOD PRESSURE: 118 MMHG | WEIGHT: 118.6 LBS | BODY MASS INDEX: 20.25 KG/M2

## 2023-02-08 DIAGNOSIS — K50.80 CROHN'S COLITIS: ICD-10-CM

## 2023-02-08 PROCEDURE — 96413 CHEMO IV INFUSION 1 HR: CPT | Performed by: INTERNAL MEDICINE

## 2023-02-08 RX ORDER — NYSTATIN 100000 [USP'U]/G
APPLY TO AFFECTED AREA TWICE DAILY FOR 14DAYS POWDER TOPICAL
Qty: 480 GRAM | Refills: 1 | Status: ACTIVE | COMMUNITY

## 2023-02-08 RX ORDER — RIFAXIMIN 550 MG/1
1 TABLET TABLET ORAL THREE TIMES A DAY
Qty: 42 TABLET | Refills: 2 | Status: ACTIVE | COMMUNITY
Start: 2023-01-12 | End: 2023-02-23

## 2023-02-08 RX ORDER — SACCHAROMYCES BOULARDII 250 MG
CAPSULE ORAL
Qty: 0 | Refills: 0 | Status: ACTIVE | COMMUNITY
Start: 1900-01-01

## 2023-02-08 RX ORDER — VILAZODONE HYDROCHLORIDE 40 MG/1
TAKE 1 TABLET (40 MG) BY ORAL ROUTE ONCE DAILY WITH FOOD TABLET ORAL 1
Qty: 0 | Refills: 0 | Status: ACTIVE | COMMUNITY
Start: 1900-01-01

## 2023-02-08 RX ORDER — LISDEXAMFETAMINE DIMESYLATE 30 MG/1
CAPSULE ORAL
Qty: 0 | Refills: 0 | Status: ACTIVE | COMMUNITY
Start: 1900-01-01

## 2023-02-14 ENCOUNTER — TELEPHONE ENCOUNTER (OUTPATIENT)
Dept: URBAN - METROPOLITAN AREA CLINIC 23 | Facility: CLINIC | Age: 43
End: 2023-02-14

## 2023-02-15 ENCOUNTER — TELEPHONE ENCOUNTER (OUTPATIENT)
Dept: URBAN - METROPOLITAN AREA CLINIC 23 | Facility: CLINIC | Age: 43
End: 2023-02-15

## 2023-02-28 ENCOUNTER — OFFICE VISIT (OUTPATIENT)
Dept: URBAN - NONMETROPOLITAN AREA CLINIC 2 | Facility: CLINIC | Age: 43
End: 2023-02-28

## 2023-02-28 ENCOUNTER — LAB OUTSIDE AN ENCOUNTER (OUTPATIENT)
Dept: URBAN - NONMETROPOLITAN AREA CLINIC 2 | Facility: CLINIC | Age: 43
End: 2023-02-28

## 2023-02-28 RX ORDER — NYSTATIN 100000 [USP'U]/G
APPLY TO AFFECTED AREA TWICE DAILY FOR 14DAYS POWDER TOPICAL
Qty: 480 GRAM | Refills: 1 | Status: ACTIVE | COMMUNITY

## 2023-02-28 RX ORDER — VILAZODONE HYDROCHLORIDE 40 MG/1
TAKE 1 TABLET (40 MG) BY ORAL ROUTE ONCE DAILY WITH FOOD TABLET ORAL 1
Qty: 0 | Refills: 0 | Status: ACTIVE | COMMUNITY
Start: 1900-01-01

## 2023-02-28 RX ORDER — SACCHAROMYCES BOULARDII 250 MG
CAPSULE ORAL
Qty: 0 | Refills: 0 | Status: ACTIVE | COMMUNITY
Start: 1900-01-01

## 2023-02-28 RX ORDER — LISDEXAMFETAMINE DIMESYLATE 30 MG/1
CAPSULE ORAL
Qty: 0 | Refills: 0 | Status: ACTIVE | COMMUNITY
Start: 1900-01-01

## 2023-03-05 LAB
A/G RATIO: 1.7
ALBUMIN: 4.5
ALKALINE PHOSPHATASE: 67
ALT (SGPT): 27
AST (SGOT): 27
BILIRUBIN, TOTAL: 2
BUN/CREATININE RATIO: (no result)
BUN: 11
C-REACTIVE PROTEIN, QUANT: 0.3
CALCIUM: 9.9
CARBON DIOXIDE, TOTAL: 30
CHLORIDE: 97
CREATININE: 1.06
EGFR: 89
GLOBULIN, TOTAL: 2.6
GLUCOSE: 133
HBSAG SCREEN: (no result)
HEP A AB, IGM: (no result)
HEP B CORE AB, IGM: (no result)
HEP C VIRUS AB: <0.02
HEPATITIS C ANTIBODY: (no result)
MITOGEN-NIL: >10
POTASSIUM: 4.2
PROTEIN, TOTAL: 7.1
QUANTIFERON NIL VALUE: 0.04
QUANTIFERON TB1 AG VALUE: <0
QUANTIFERON TB2 AG VALUE: <0
QUANTIFERON-TB GOLD PLUS: NEGATIVE
SED RATE BY MODIFIED: 6
SODIUM: 138

## 2023-03-07 ENCOUNTER — TELEPHONE ENCOUNTER (OUTPATIENT)
Dept: URBAN - METROPOLITAN AREA CLINIC 92 | Facility: CLINIC | Age: 43
End: 2023-03-07

## 2023-03-17 ENCOUNTER — TELEPHONE ENCOUNTER (OUTPATIENT)
Dept: URBAN - METROPOLITAN AREA CLINIC 35 | Facility: CLINIC | Age: 43
End: 2023-03-17

## 2023-03-24 ENCOUNTER — TELEPHONE ENCOUNTER (OUTPATIENT)
Dept: URBAN - NONMETROPOLITAN AREA CLINIC 2 | Facility: CLINIC | Age: 43
End: 2023-03-24

## 2023-04-11 ENCOUNTER — OFFICE VISIT (OUTPATIENT)
Dept: URBAN - NONMETROPOLITAN AREA CLINIC 2 | Facility: CLINIC | Age: 43
End: 2023-04-11
Payer: COMMERCIAL

## 2023-04-11 ENCOUNTER — WEB ENCOUNTER (OUTPATIENT)
Dept: URBAN - NONMETROPOLITAN AREA CLINIC 2 | Facility: CLINIC | Age: 43
End: 2023-04-11

## 2023-04-11 VITALS
HEART RATE: 79 BPM | BODY MASS INDEX: 20.73 KG/M2 | SYSTOLIC BLOOD PRESSURE: 130 MMHG | WEIGHT: 121.4 LBS | HEIGHT: 64 IN | DIASTOLIC BLOOD PRESSURE: 84 MMHG

## 2023-04-11 DIAGNOSIS — K50.80 CROHN'S DISEASE OF BOTH SMALL AND LARGE INTESTINE WITHOUT COMPLICATION: ICD-10-CM

## 2023-04-11 DIAGNOSIS — R17 ELEVATED BILIRUBIN: ICD-10-CM

## 2023-04-11 DIAGNOSIS — K94.13 ILEOSTOMY DYSFUNCTION: ICD-10-CM

## 2023-04-11 PROBLEM — 26165005: Status: ACTIVE | Noted: 2023-01-17

## 2023-04-11 PROCEDURE — 99214 OFFICE O/P EST MOD 30 MIN: CPT | Performed by: INTERNAL MEDICINE

## 2023-04-11 RX ORDER — VILAZODONE HYDROCHLORIDE 40 MG/1
TAKE 1 TABLET (40 MG) BY ORAL ROUTE ONCE DAILY WITH FOOD TABLET ORAL 1
Qty: 0 | Refills: 0 | Status: ACTIVE | COMMUNITY
Start: 1900-01-01

## 2023-04-11 RX ORDER — SACCHAROMYCES BOULARDII 250 MG
CAPSULE ORAL
Qty: 0 | Refills: 0 | Status: ACTIVE | COMMUNITY
Start: 1900-01-01

## 2023-04-11 RX ORDER — NYSTATIN 100000 [USP'U]/G
APPLY TO AFFECTED AREA TWICE DAILY FOR 14DAYS POWDER TOPICAL
Qty: 480 GRAM | Refills: 1 | Status: ACTIVE | COMMUNITY

## 2023-04-11 RX ORDER — LISDEXAMFETAMINE DIMESYLATE 30 MG/1
CAPSULE ORAL
Qty: 0 | Refills: 0 | Status: ACTIVE | COMMUNITY
Start: 1900-01-01

## 2023-04-11 NOTE — HPI-TODAY'S VISIT:
8/5/2020 Kwesi presents for follow up of Crohn's disease s/p colectomy with end ileostomy. His output is good with no bleeding or pain. He is tolerating his entivyo Q 8 weeks. 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 Dr. Jurado has recommended a binder. He is loosing seal on his ostomy and has a rash at the stoma. He is due for lab work. Today he is doing well. MB   2/4/20 Mr. Finch presents for f/u of his Crohn's disease.  he is maintained on Entyvio and doing well.  His only complaint today is that his ostomy seems to be herniating again.  he has not followed up with Dr. Jurado.  He has not had any flares of his Crohn's.  OVerall, he is doing well today.  9/16/19 Kwesi presents for follow up of Crohn's disease s/p colectomy with end ilostomy. He recently fell while skating and developed a parastomal hernia. He had to have emergency surgery by Dr. Jurado. He is doing well postop with no complications, good output. He did loose weight during this and would like to meet with jian. Today he is doing well otherwise with no new GI complaints. MB /11/23 4/11/2023 The patient presents today for follow-up of his Crohn's disease status post colectomy with an ileostomy.  Since her last visit, he has been doing quite well.  He is on Entyvio every 8 weeks.  He denies any blood in his stool and his output.  He does have a small parastomal hernia, but it does not give him problems.  He denies any abdominal pain.  He is now on Dupixent for eczema and psoriasis, and this is well controlled.  Overall, from a GI standpoint, he is doing quite well.  He had his labs done recently which were within normal limits.  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  We will see him back in the office in 1 year.

## 2023-05-22 ENCOUNTER — TELEPHONE ENCOUNTER (OUTPATIENT)
Dept: URBAN - METROPOLITAN AREA CLINIC 6 | Facility: CLINIC | Age: 43
End: 2023-05-22

## 2023-05-23 ENCOUNTER — OFFICE VISIT (OUTPATIENT)
Dept: URBAN - NONMETROPOLITAN AREA CLINIC 1 | Facility: CLINIC | Age: 43
End: 2023-05-23
Payer: COMMERCIAL

## 2023-05-23 VITALS
TEMPERATURE: 97.5 F | HEIGHT: 64 IN | SYSTOLIC BLOOD PRESSURE: 124 MMHG | WEIGHT: 120.8 LBS | DIASTOLIC BLOOD PRESSURE: 87 MMHG | BODY MASS INDEX: 20.62 KG/M2

## 2023-05-23 DIAGNOSIS — K50.80 CROHN'S DISEASE OF BOTH SMALL AND LARGE INTESTINE WITHOUT COMPLICATION: ICD-10-CM

## 2023-05-23 PROCEDURE — 96413 CHEMO IV INFUSION 1 HR: CPT | Performed by: INTERNAL MEDICINE

## 2023-05-23 RX ORDER — VILAZODONE HYDROCHLORIDE 40 MG/1
TAKE 1 TABLET (40 MG) BY ORAL ROUTE ONCE DAILY WITH FOOD TABLET ORAL 1
Qty: 0 | Refills: 0 | Status: ACTIVE | COMMUNITY
Start: 1900-01-01

## 2023-05-23 RX ORDER — LISDEXAMFETAMINE DIMESYLATE 30 MG/1
CAPSULE ORAL
Qty: 0 | Refills: 0 | Status: ACTIVE | COMMUNITY
Start: 1900-01-01

## 2023-05-23 RX ORDER — SACCHAROMYCES BOULARDII 250 MG
CAPSULE ORAL
Qty: 0 | Refills: 0 | Status: ACTIVE | COMMUNITY
Start: 1900-01-01

## 2023-05-23 RX ORDER — NYSTATIN 100000 [USP'U]/G
APPLY TO AFFECTED AREA TWICE DAILY FOR 14DAYS POWDER TOPICAL
Qty: 480 GRAM | Refills: 1 | Status: ACTIVE | COMMUNITY

## 2023-07-18 ENCOUNTER — OFFICE VISIT (OUTPATIENT)
Dept: URBAN - NONMETROPOLITAN AREA CLINIC 1 | Facility: CLINIC | Age: 43
End: 2023-07-18
Payer: COMMERCIAL

## 2023-07-18 VITALS
BODY MASS INDEX: 21.13 KG/M2 | SYSTOLIC BLOOD PRESSURE: 109 MMHG | DIASTOLIC BLOOD PRESSURE: 71 MMHG | WEIGHT: 123.8 LBS | TEMPERATURE: 97.4 F | HEIGHT: 64 IN

## 2023-07-18 DIAGNOSIS — K50.80 CROHN'S COLITIS: ICD-10-CM

## 2023-07-18 PROCEDURE — 96413 CHEMO IV INFUSION 1 HR: CPT | Performed by: INTERNAL MEDICINE

## 2023-07-18 RX ORDER — VILAZODONE HYDROCHLORIDE 40 MG/1
TAKE 1 TABLET (40 MG) BY ORAL ROUTE ONCE DAILY WITH FOOD TABLET ORAL 1
Qty: 0 | Refills: 0 | Status: ACTIVE | COMMUNITY
Start: 1900-01-01

## 2023-07-18 RX ORDER — SACCHAROMYCES BOULARDII 250 MG
CAPSULE ORAL
Qty: 0 | Refills: 0 | Status: ACTIVE | COMMUNITY
Start: 1900-01-01

## 2023-07-18 RX ORDER — LISDEXAMFETAMINE DIMESYLATE 30 MG/1
CAPSULE ORAL
Qty: 0 | Refills: 0 | Status: ACTIVE | COMMUNITY
Start: 1900-01-01

## 2023-07-18 RX ORDER — NYSTATIN 100000 [USP'U]/G
APPLY TO AFFECTED AREA TWICE DAILY FOR 14DAYS POWDER TOPICAL
Qty: 480 GRAM | Refills: 1 | Status: ACTIVE | COMMUNITY

## 2023-09-12 ENCOUNTER — OFFICE VISIT (OUTPATIENT)
Dept: URBAN - NONMETROPOLITAN AREA CLINIC 1 | Facility: CLINIC | Age: 43
End: 2023-09-12
Payer: COMMERCIAL

## 2023-09-12 VITALS
DIASTOLIC BLOOD PRESSURE: 71 MMHG | TEMPERATURE: 97.4 F | WEIGHT: 123 LBS | SYSTOLIC BLOOD PRESSURE: 114 MMHG | HEIGHT: 64 IN | BODY MASS INDEX: 21 KG/M2

## 2023-09-12 DIAGNOSIS — K50.80 CROHN'S COLITIS: ICD-10-CM

## 2023-09-12 PROCEDURE — 96413 CHEMO IV INFUSION 1 HR: CPT | Performed by: INTERNAL MEDICINE

## 2023-09-12 RX ORDER — VILAZODONE HYDROCHLORIDE 40 MG/1
TAKE 1 TABLET (40 MG) BY ORAL ROUTE ONCE DAILY WITH FOOD TABLET ORAL 1
Qty: 0 | Refills: 0 | Status: ACTIVE | COMMUNITY
Start: 1900-01-01

## 2023-09-12 RX ORDER — NYSTATIN 100000 [USP'U]/G
APPLY TO AFFECTED AREA TWICE DAILY FOR 14DAYS POWDER TOPICAL
Qty: 480 GRAM | Refills: 1 | Status: ACTIVE | COMMUNITY

## 2023-09-12 RX ORDER — SACCHAROMYCES BOULARDII 250 MG
CAPSULE ORAL
Qty: 0 | Refills: 0 | Status: ACTIVE | COMMUNITY
Start: 1900-01-01

## 2023-09-12 RX ORDER — LISDEXAMFETAMINE DIMESYLATE 30 MG/1
CAPSULE ORAL
Qty: 0 | Refills: 0 | Status: ACTIVE | COMMUNITY
Start: 1900-01-01

## 2023-11-14 ENCOUNTER — OFFICE VISIT (OUTPATIENT)
Dept: URBAN - NONMETROPOLITAN AREA CLINIC 1 | Facility: CLINIC | Age: 43
End: 2023-11-14

## 2023-11-21 ENCOUNTER — OFFICE VISIT (OUTPATIENT)
Dept: URBAN - NONMETROPOLITAN AREA CLINIC 1 | Facility: CLINIC | Age: 43
End: 2023-11-21
Payer: COMMERCIAL

## 2023-11-21 VITALS
DIASTOLIC BLOOD PRESSURE: 83 MMHG | SYSTOLIC BLOOD PRESSURE: 126 MMHG | BODY MASS INDEX: 21.41 KG/M2 | WEIGHT: 125.4 LBS | HEIGHT: 64 IN | TEMPERATURE: 97.1 F

## 2023-11-21 DIAGNOSIS — K50.80 CROHN'S DISEASE OF BOTH SMALL AND LARGE INTESTINE WITHOUT COMPLICATION: ICD-10-CM

## 2023-11-21 PROCEDURE — 96413 CHEMO IV INFUSION 1 HR: CPT | Performed by: INTERNAL MEDICINE

## 2023-11-21 RX ORDER — NYSTATIN 100000 [USP'U]/G
APPLY TO AFFECTED AREA TWICE DAILY FOR 14DAYS POWDER TOPICAL
Qty: 480 GRAM | Refills: 1 | Status: ACTIVE | COMMUNITY

## 2023-11-21 RX ORDER — VILAZODONE HYDROCHLORIDE 40 MG/1
TAKE 1 TABLET (40 MG) BY ORAL ROUTE ONCE DAILY WITH FOOD TABLET ORAL 1
Qty: 0 | Refills: 0 | Status: ACTIVE | COMMUNITY
Start: 1900-01-01

## 2023-11-21 RX ORDER — LISDEXAMFETAMINE DIMESYLATE 30 MG/1
CAPSULE ORAL
Qty: 0 | Refills: 0 | Status: ACTIVE | COMMUNITY
Start: 1900-01-01

## 2023-11-21 RX ORDER — SACCHAROMYCES BOULARDII 250 MG
CAPSULE ORAL
Qty: 0 | Refills: 0 | Status: ACTIVE | COMMUNITY
Start: 1900-01-01

## 2024-01-17 ENCOUNTER — OFFICE VISIT (OUTPATIENT)
Dept: URBAN - NONMETROPOLITAN AREA CLINIC 1 | Facility: CLINIC | Age: 44
End: 2024-01-17
Payer: COMMERCIAL

## 2024-01-17 VITALS
SYSTOLIC BLOOD PRESSURE: 124 MMHG | WEIGHT: 118.6 LBS | HEIGHT: 64 IN | DIASTOLIC BLOOD PRESSURE: 87 MMHG | BODY MASS INDEX: 20.25 KG/M2 | TEMPERATURE: 97.8 F

## 2024-01-17 DIAGNOSIS — K50.80 CROHN'S DISEASE OF BOTH SMALL AND LARGE INTESTINE WITHOUT COMPLICATION: ICD-10-CM

## 2024-01-17 PROCEDURE — 96413 CHEMO IV INFUSION 1 HR: CPT | Performed by: INTERNAL MEDICINE

## 2024-01-17 RX ORDER — NYSTATIN 100000 [USP'U]/G
APPLY TO AFFECTED AREA TWICE DAILY FOR 14DAYS POWDER TOPICAL
Qty: 480 GRAM | Refills: 1 | Status: ACTIVE | COMMUNITY

## 2024-01-17 RX ORDER — SACCHAROMYCES BOULARDII 250 MG
CAPSULE ORAL
Qty: 0 | Refills: 0 | Status: ACTIVE | COMMUNITY
Start: 1900-01-01

## 2024-01-17 RX ORDER — VILAZODONE HYDROCHLORIDE 40 MG/1
TAKE 1 TABLET (40 MG) BY ORAL ROUTE ONCE DAILY WITH FOOD TABLET ORAL 1
Qty: 0 | Refills: 0 | Status: ACTIVE | COMMUNITY
Start: 1900-01-01

## 2024-01-17 RX ORDER — LISDEXAMFETAMINE DIMESYLATE 30 MG/1
CAPSULE ORAL
Qty: 0 | Refills: 0 | Status: ACTIVE | COMMUNITY
Start: 1900-01-01

## 2024-03-26 ENCOUNTER — ENT (OUTPATIENT)
Dept: URBAN - NONMETROPOLITAN AREA CLINIC 1 | Facility: CLINIC | Age: 44
End: 2024-03-26

## 2024-03-27 ENCOUNTER — ENT (OUTPATIENT)
Dept: URBAN - NONMETROPOLITAN AREA CLINIC 1 | Facility: CLINIC | Age: 44
End: 2024-03-27
Payer: COMMERCIAL

## 2024-03-27 VITALS
BODY MASS INDEX: 20.83 KG/M2 | DIASTOLIC BLOOD PRESSURE: 79 MMHG | HEIGHT: 64 IN | SYSTOLIC BLOOD PRESSURE: 121 MMHG | WEIGHT: 122 LBS | TEMPERATURE: 97.8 F

## 2024-03-27 DIAGNOSIS — K50.80 CROHN'S DISEASE OF BOTH SMALL AND LARGE INTESTINE WITHOUT COMPLICATION: ICD-10-CM

## 2024-03-27 PROCEDURE — 96413 CHEMO IV INFUSION 1 HR: CPT | Performed by: INTERNAL MEDICINE

## 2024-03-27 RX ORDER — LISDEXAMFETAMINE DIMESYLATE 30 MG/1
CAPSULE ORAL
Qty: 0 | Refills: 0 | Status: ACTIVE | COMMUNITY
Start: 1900-01-01

## 2024-03-27 RX ORDER — SACCHAROMYCES BOULARDII 250 MG
CAPSULE ORAL
Qty: 0 | Refills: 0 | Status: ACTIVE | COMMUNITY
Start: 1900-01-01

## 2024-03-27 RX ORDER — NYSTATIN 100000 [USP'U]/G
APPLY TO AFFECTED AREA TWICE DAILY FOR 14DAYS POWDER TOPICAL
Qty: 480 GRAM | Refills: 1 | Status: ACTIVE | COMMUNITY

## 2024-03-27 RX ORDER — VILAZODONE HYDROCHLORIDE 40 MG/1
TAKE 1 TABLET (40 MG) BY ORAL ROUTE ONCE DAILY WITH FOOD TABLET ORAL 1
Qty: 0 | Refills: 0 | Status: ACTIVE | COMMUNITY
Start: 1900-01-01

## 2024-04-09 ENCOUNTER — OV EP (OUTPATIENT)
Dept: URBAN - NONMETROPOLITAN AREA CLINIC 2 | Facility: CLINIC | Age: 44
End: 2024-04-09
Payer: COMMERCIAL

## 2024-04-09 VITALS
HEART RATE: 66 BPM | TEMPERATURE: 98 F | HEIGHT: 64 IN | DIASTOLIC BLOOD PRESSURE: 82 MMHG | WEIGHT: 122 LBS | BODY MASS INDEX: 20.83 KG/M2 | SYSTOLIC BLOOD PRESSURE: 123 MMHG

## 2024-04-09 DIAGNOSIS — K94.13 ILEOSTOMY DYSFUNCTION: ICD-10-CM

## 2024-04-09 DIAGNOSIS — R17 ELEVATED BILIRUBIN: ICD-10-CM

## 2024-04-09 DIAGNOSIS — K50.80 CROHN'S DISEASE OF BOTH SMALL AND LARGE INTESTINE WITHOUT COMPLICATION: ICD-10-CM

## 2024-04-09 PROCEDURE — 99214 OFFICE O/P EST MOD 30 MIN: CPT | Performed by: INTERNAL MEDICINE

## 2024-04-09 RX ORDER — LISDEXAMFETAMINE DIMESYLATE 30 MG/1
CAPSULE ORAL
Qty: 0 | Refills: 0 | Status: ACTIVE | COMMUNITY
Start: 1900-01-01

## 2024-04-09 RX ORDER — NYSTATIN 100000 [USP'U]/G
APPLY TO AFFECTED AREA TWICE DAILY FOR 14DAYS POWDER TOPICAL
Qty: 480 GRAM | Refills: 1 | Status: ACTIVE | COMMUNITY

## 2024-04-09 RX ORDER — VILAZODONE HYDROCHLORIDE 40 MG/1
TAKE 1 TABLET (40 MG) BY ORAL ROUTE ONCE DAILY WITH FOOD TABLET ORAL 1
Qty: 0 | Refills: 0 | Status: ACTIVE | COMMUNITY
Start: 1900-01-01

## 2024-04-09 RX ORDER — SACCHAROMYCES BOULARDII 250 MG
CAPSULE ORAL
Qty: 0 | Refills: 0 | Status: ACTIVE | COMMUNITY
Start: 1900-01-01

## 2024-04-09 NOTE — HPI-TODAY'S VISIT:
8/5/2020 Kwesi presents for follow up of Crohn's disease s/p colectomy with end ileostomy. His output is good with no bleeding or pain. He is tolerating his entivyo Q 8 weeks. 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 Dr. Jurado has recommended a binder. He is loosing seal on his ostomy and has a rash at the stoma. He is due for lab work. Today he is doing well. MB   2/4/20 Mr. Finch presents for f/u of his Crohn's disease.  he is maintained on Entyvio and doing well.  His only complaint today is that his ostomy seems to be herniating again.  he has not followed up with Dr. Jurado.  He has not had any flares of his Crohn's.  OVerall, he is doing well today.  9/16/19 Kwesi presents for follow up of Crohn's disease s/p colectomy with end ilostomy. He recently fell while skating and developed a parastomal hernia. He had to have emergency surgery by Dr. Jurado. He is doing well postop with no complications, good output. He did loose weight during this and would like to meet with jian. Today he is doing well otherwise with no new GI complaints. MB /11/23 4/11/2023 The patient presents today for follow-up of his Crohn's disease status post colectomy with an ileostomy.  Since her last visit, he has been doing quite well.  He is on Entyvio every 8 weeks.  He denies any blood in his stool and his output.  He does have a small parastomal hernia, but it does not give him problems.  He denies any abdominal pain.  He is now on Dupixent for eczema and psoriasis, and this is well controlled.  Overall, from a GI standpoint, he is doing quite well.  He had his labs done recently which were within normal limits.  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  We will see him back in the office in 1 year.  4/9/24 The patient presents today for follow-up of his Crohn's disease status post colectomy with an ileostomy. Since her last visit, he has been doing quite well. He is on Entyvio every 8 weeks. He denies any blood in his stool and his output. He does have a small parastomal hernia, but it does not give him problems. He denies any abdominal pain. He is now on Dupixent for eczema and psoriasis, and this is well controlled. Overall, from a GI standpoint, he is doing quite well. He is due for repeat bloodwork.  We have discussed injectable entyvio, and he wants to consider this.   We will see him back in the office in 1 year.

## 2024-04-12 LAB
ABSOLUTE BASOPHILS: 60
ABSOLUTE EOSINOPHILS: 369
ABSOLUTE LYMPHOCYTES: 2023
ABSOLUTE MONOCYTES: 429
ABSOLUTE NEUTROPHILS: 3819
ALBUMIN/GLOBULIN RATIO: 1.4
ALBUMIN: 4.7
ALKALINE PHOSPHATASE: 61
ALT (SGPT): 20
AST (SGOT): 25
BASOPHILS: 0.9
BILIRUBIN, DIRECT: 0.3
BILIRUBIN, INDIRECT: 2
BILIRUBIN, TOTAL: 2.3
BUN/CREATININE RATIO: (no result)
C-REACTIVE PROTEIN, QUANT: 0.5
CALCIUM: 10.3
CARBON DIOXIDE: 33
CHLORIDE: 95
CREATININE: 0.88
EGFR: 108
EOSINOPHILS: 5.5
GLOBULIN: 3.3
GLUCOSE: 91
HEMATOCRIT: 45.3
HEMOGLOBIN: 15.5
HEPATITIS B CORE AB TOTAL: (no result)
HEPATITIS B SURFACE ANTIGEN: (no result)
LYMPHOCYTES: 30.2
MCH: 29.6
MCHC: 34.2
MCV: 86.6
MITOGEN-NIL: 9.25
MONOCYTES: 6.4
MPV: 9.7
NEUTROPHILS: 57
PLATELET COUNT: 282
POTASSIUM: 4.4
PROTEIN, TOTAL: 8
QUANTIFERON NIL VALUE: 0.06
QUANTIFERON TB1 AG VALUE: 0.01
QUANTIFERON TB2 AG VALUE: 0.01
QUANTIFERON-TB GOLD PLUS: NEGATIVE
RDW: 12.8
RED BLOOD CELL COUNT: 5.23
SED RATE BY MODIFIED: 2
SODIUM: 137
UREA NITROGEN (BUN): 10
WHITE BLOOD CELL COUNT: 6.7

## 2024-05-22 ENCOUNTER — OFFICE VISIT (OUTPATIENT)
Dept: URBAN - NONMETROPOLITAN AREA CLINIC 1 | Facility: CLINIC | Age: 44
End: 2024-05-22
Payer: COMMERCIAL

## 2024-05-22 VITALS
SYSTOLIC BLOOD PRESSURE: 129 MMHG | TEMPERATURE: 97.4 F | WEIGHT: 122 LBS | BODY MASS INDEX: 20.83 KG/M2 | HEIGHT: 64 IN | DIASTOLIC BLOOD PRESSURE: 76 MMHG

## 2024-05-22 DIAGNOSIS — K50.80 CROHN'S COLITIS: ICD-10-CM

## 2024-05-22 PROCEDURE — 96413 CHEMO IV INFUSION 1 HR: CPT | Performed by: INTERNAL MEDICINE

## 2024-05-22 RX ORDER — NYSTATIN 100000 [USP'U]/G
APPLY TO AFFECTED AREA TWICE DAILY FOR 14DAYS POWDER TOPICAL
Qty: 480 GRAM | Refills: 1 | Status: ACTIVE | COMMUNITY

## 2024-05-22 RX ORDER — VILAZODONE HYDROCHLORIDE 40 MG/1
TAKE 1 TABLET (40 MG) BY ORAL ROUTE ONCE DAILY WITH FOOD TABLET ORAL 1
Qty: 0 | Refills: 0 | Status: ACTIVE | COMMUNITY
Start: 1900-01-01

## 2024-05-22 RX ORDER — SACCHAROMYCES BOULARDII 250 MG
CAPSULE ORAL
Qty: 0 | Refills: 0 | Status: ACTIVE | COMMUNITY
Start: 1900-01-01

## 2024-05-22 RX ORDER — LISDEXAMFETAMINE DIMESYLATE 30 MG/1
CAPSULE ORAL
Qty: 0 | Refills: 0 | Status: ACTIVE | COMMUNITY
Start: 1900-01-01

## 2024-07-17 ENCOUNTER — OFFICE VISIT (OUTPATIENT)
Dept: URBAN - NONMETROPOLITAN AREA CLINIC 1 | Facility: CLINIC | Age: 44
End: 2024-07-17
Payer: COMMERCIAL

## 2024-07-17 VITALS
HEIGHT: 64 IN | DIASTOLIC BLOOD PRESSURE: 86 MMHG | SYSTOLIC BLOOD PRESSURE: 119 MMHG | BODY MASS INDEX: 20.52 KG/M2 | TEMPERATURE: 97.3 F | WEIGHT: 120.2 LBS

## 2024-07-17 DIAGNOSIS — K50.80 CROHN'S COLITIS: ICD-10-CM

## 2024-07-17 PROCEDURE — 96413 CHEMO IV INFUSION 1 HR: CPT | Performed by: INTERNAL MEDICINE

## 2024-07-17 RX ORDER — NYSTATIN 100000 [USP'U]/G
APPLY TO AFFECTED AREA TWICE DAILY FOR 14DAYS POWDER TOPICAL
Qty: 480 GRAM | Refills: 1 | Status: ACTIVE | COMMUNITY

## 2024-07-17 RX ORDER — VILAZODONE HYDROCHLORIDE 40 MG/1
TAKE 1 TABLET (40 MG) BY ORAL ROUTE ONCE DAILY WITH FOOD TABLET ORAL 1
Qty: 0 | Refills: 0 | Status: ACTIVE | COMMUNITY
Start: 1900-01-01

## 2024-07-17 RX ORDER — LISDEXAMFETAMINE DIMESYLATE 30 MG/1
CAPSULE ORAL
Qty: 0 | Refills: 0 | Status: ACTIVE | COMMUNITY
Start: 1900-01-01

## 2024-07-17 RX ORDER — SACCHAROMYCES BOULARDII 250 MG
CAPSULE ORAL
Qty: 0 | Refills: 0 | Status: ACTIVE | COMMUNITY
Start: 1900-01-01

## 2024-09-18 ENCOUNTER — OFFICE VISIT (OUTPATIENT)
Dept: URBAN - NONMETROPOLITAN AREA CLINIC 1 | Facility: CLINIC | Age: 44
End: 2024-09-18
Payer: COMMERCIAL

## 2024-09-18 VITALS
HEART RATE: 81 BPM | WEIGHT: 122.8 LBS | RESPIRATION RATE: 18 BRPM | BODY MASS INDEX: 20.96 KG/M2 | TEMPERATURE: 97.1 F | SYSTOLIC BLOOD PRESSURE: 130 MMHG | HEIGHT: 64 IN | DIASTOLIC BLOOD PRESSURE: 77 MMHG

## 2024-09-18 DIAGNOSIS — K50.80 CROHN'S COLITIS: ICD-10-CM

## 2024-09-18 PROCEDURE — 96413 CHEMO IV INFUSION 1 HR: CPT | Performed by: INTERNAL MEDICINE

## 2024-09-18 RX ORDER — VILAZODONE HYDROCHLORIDE 40 MG/1
TAKE 1 TABLET (40 MG) BY ORAL ROUTE ONCE DAILY WITH FOOD TABLET ORAL 1
Qty: 0 | Refills: 0 | Status: ACTIVE | COMMUNITY
Start: 1900-01-01

## 2024-09-18 RX ORDER — NYSTATIN 100000 [USP'U]/G
APPLY TO AFFECTED AREA TWICE DAILY FOR 14DAYS POWDER TOPICAL
Qty: 480 GRAM | Refills: 1 | Status: ACTIVE | COMMUNITY

## 2024-09-18 RX ORDER — LISDEXAMFETAMINE DIMESYLATE 30 MG/1
CAPSULE ORAL
Qty: 0 | Refills: 0 | Status: ACTIVE | COMMUNITY
Start: 1900-01-01

## 2024-09-18 RX ORDER — SACCHAROMYCES BOULARDII 250 MG
CAPSULE ORAL
Qty: 0 | Refills: 0 | Status: ACTIVE | COMMUNITY
Start: 1900-01-01

## 2024-11-13 ENCOUNTER — OFFICE VISIT (OUTPATIENT)
Dept: URBAN - NONMETROPOLITAN AREA CLINIC 1 | Facility: CLINIC | Age: 44
End: 2024-11-13
Payer: COMMERCIAL

## 2024-11-13 VITALS
SYSTOLIC BLOOD PRESSURE: 114 MMHG | WEIGHT: 123 LBS | TEMPERATURE: 97.4 F | HEIGHT: 64 IN | BODY MASS INDEX: 21 KG/M2 | DIASTOLIC BLOOD PRESSURE: 81 MMHG

## 2024-11-13 DIAGNOSIS — K50.90 CROHN'S DISEASE: ICD-10-CM

## 2024-11-13 PROCEDURE — 96413 CHEMO IV INFUSION 1 HR: CPT | Performed by: INTERNAL MEDICINE

## 2024-11-13 RX ORDER — LISDEXAMFETAMINE DIMESYLATE 30 MG/1
CAPSULE ORAL
Qty: 0 | Refills: 0 | Status: ACTIVE | COMMUNITY
Start: 1900-01-01

## 2024-11-13 RX ORDER — VILAZODONE HYDROCHLORIDE 40 MG/1
TAKE 1 TABLET (40 MG) BY ORAL ROUTE ONCE DAILY WITH FOOD TABLET ORAL 1
Qty: 0 | Refills: 0 | Status: ACTIVE | COMMUNITY
Start: 1900-01-01

## 2024-11-13 RX ORDER — NYSTATIN 100000 [USP'U]/G
APPLY TO AFFECTED AREA TWICE DAILY FOR 14DAYS POWDER TOPICAL
Qty: 480 GRAM | Refills: 1 | Status: ACTIVE | COMMUNITY

## 2024-11-13 RX ORDER — SACCHAROMYCES BOULARDII 250 MG
CAPSULE ORAL
Qty: 0 | Refills: 0 | Status: ACTIVE | COMMUNITY
Start: 1900-01-01

## 2025-01-08 ENCOUNTER — OFFICE VISIT (OUTPATIENT)
Dept: URBAN - NONMETROPOLITAN AREA CLINIC 1 | Facility: CLINIC | Age: 45
End: 2025-01-08
Payer: COMMERCIAL

## 2025-01-08 VITALS
DIASTOLIC BLOOD PRESSURE: 94 MMHG | SYSTOLIC BLOOD PRESSURE: 149 MMHG | WEIGHT: 126.4 LBS | HEART RATE: 65 BPM | BODY MASS INDEX: 21.58 KG/M2 | HEIGHT: 64 IN

## 2025-01-08 DIAGNOSIS — K50.80 CROHN'S DISEASE OF BOTH SMALL AND LARGE INTESTINE WITHOUT COMPLICATION: ICD-10-CM

## 2025-01-08 PROCEDURE — 96413 CHEMO IV INFUSION 1 HR: CPT | Performed by: INTERNAL MEDICINE

## 2025-01-08 RX ORDER — SACCHAROMYCES BOULARDII 250 MG
CAPSULE ORAL
Qty: 0 | Refills: 0 | Status: ACTIVE | COMMUNITY
Start: 1900-01-01

## 2025-01-08 RX ORDER — NYSTATIN 100000 [USP'U]/G
APPLY TO AFFECTED AREA TWICE DAILY FOR 14DAYS POWDER TOPICAL
Qty: 480 GRAM | Refills: 1 | Status: ACTIVE | COMMUNITY

## 2025-01-08 RX ORDER — VILAZODONE HYDROCHLORIDE 40 MG/1
TAKE 1 TABLET (40 MG) BY ORAL ROUTE ONCE DAILY WITH FOOD TABLET ORAL 1
Qty: 0 | Refills: 0 | Status: ACTIVE | COMMUNITY
Start: 1900-01-01

## 2025-01-08 RX ORDER — LISDEXAMFETAMINE DIMESYLATE 30 MG/1
CAPSULE ORAL
Qty: 0 | Refills: 0 | Status: ACTIVE | COMMUNITY
Start: 1900-01-01

## 2025-03-05 ENCOUNTER — TELEPHONE ENCOUNTER (OUTPATIENT)
Dept: URBAN - NONMETROPOLITAN AREA CLINIC 1 | Facility: CLINIC | Age: 45
End: 2025-03-05

## 2025-03-05 ENCOUNTER — OFFICE VISIT (OUTPATIENT)
Dept: URBAN - NONMETROPOLITAN AREA CLINIC 1 | Facility: CLINIC | Age: 45
End: 2025-03-05
Payer: COMMERCIAL

## 2025-03-05 VITALS
BODY MASS INDEX: 21.54 KG/M2 | WEIGHT: 126.2 LBS | DIASTOLIC BLOOD PRESSURE: 84 MMHG | TEMPERATURE: 97.5 F | HEIGHT: 64 IN | SYSTOLIC BLOOD PRESSURE: 125 MMHG

## 2025-03-05 DIAGNOSIS — K50.80 CROHN'S DISEASE OF BOTH SMALL AND LARGE INTESTINE WITHOUT COMPLICATION: ICD-10-CM

## 2025-03-05 PROCEDURE — 96413 CHEMO IV INFUSION 1 HR: CPT | Performed by: INTERNAL MEDICINE

## 2025-03-05 RX ORDER — DIPHENHYDRAMINE HCL 2 %
1 CAPSULE AT BEDTIME AS NEEDED CREAM (GRAM) TOPICAL ONCE A DAY
Qty: 30 | OUTPATIENT
Start: 2025-03-10 | End: 2025-04-09

## 2025-03-05 RX ORDER — SACCHAROMYCES BOULARDII 250 MG
CAPSULE ORAL
Qty: 0 | Refills: 0 | Status: ACTIVE | COMMUNITY
Start: 1900-01-01

## 2025-03-05 RX ORDER — VEDOLIZUMAB 300 MG/5ML
AS DIRECTED INJECTION, POWDER, LYOPHILIZED, FOR SOLUTION INTRAVENOUS
OUTPATIENT
Start: 2025-03-10

## 2025-03-05 RX ORDER — LISDEXAMFETAMINE DIMESYLATE 30 MG/1
CAPSULE ORAL
Qty: 0 | Refills: 0 | Status: ACTIVE | COMMUNITY
Start: 1900-01-01

## 2025-03-05 RX ORDER — VILAZODONE HYDROCHLORIDE 40 MG/1
TAKE 1 TABLET (40 MG) BY ORAL ROUTE ONCE DAILY WITH FOOD TABLET ORAL 1
Qty: 0 | Refills: 0 | Status: ACTIVE | COMMUNITY
Start: 1900-01-01

## 2025-03-05 RX ORDER — ACETAMINOPHEN 650 MG
2 TABLETS AS NEEDED TABLET, EXTENDED RELEASE ORAL
OUTPATIENT
Start: 2025-03-10

## 2025-03-05 RX ORDER — NYSTATIN 100000 [USP'U]/G
APPLY TO AFFECTED AREA TWICE DAILY FOR 14DAYS POWDER TOPICAL
Qty: 480 GRAM | Refills: 1 | Status: ACTIVE | COMMUNITY

## 2025-03-21 LAB
A/G RATIO: 1.7
ABSOLUTE BASOPHILS: 82
ABSOLUTE EOSINOPHILS: 567
ABSOLUTE LYMPHOCYTES: 2199
ABSOLUTE MONOCYTES: 428
ABSOLUTE NEUTROPHILS: 3024
ALBUMIN: 4.8
ALKALINE PHOSPHATASE: 57
ALT (SGPT): 17
AST (SGOT): 21
BASOPHILS: 1.3
BILIRUBIN, TOTAL: 1.8
BUN/CREATININE RATIO: (no result)
BUN: 11
C-REACTIVE PROTEIN, QUANT: <3
CALCIUM: 9.9
CARBON DIOXIDE, TOTAL: 28
CHLORIDE: 100
CREATININE: 0.81
EGFR: 110
EOSINOPHILS: 9
GLOBULIN, TOTAL: 2.9
GLUCOSE: 85
HEMATOCRIT: 45.2
HEMOGLOBIN: 15.4
HEP B CORE AB, IGM: (no result)
HEPATITIS B SURFACE ANTIGEN: (no result)
LYMPHOCYTES: 34.9
MCH: 29.9
MCHC: 34.1
MCV: 87.8
MITOGEN-NIL: >10
MONOCYTES: 6.8
MPV: 10
NEUTROPHILS: 48
PLATELET COUNT: 327
POTASSIUM: 4.5
PROTEIN, TOTAL: 7.7
QUANTIFERON NIL VALUE: 0.03
QUANTIFERON TB1 AG VALUE: 0.01
QUANTIFERON TB2 AG VALUE: 0.01
QUANTIFERON-TB GOLD PLUS: NEGATIVE
RDW: 12.5
RED BLOOD CELL COUNT: 5.15
SED RATE BY MODIFIED: 9
SODIUM: 137
WHITE BLOOD CELL COUNT: 6.3

## 2025-04-08 ENCOUNTER — OFFICE VISIT (OUTPATIENT)
Dept: URBAN - NONMETROPOLITAN AREA CLINIC 2 | Facility: CLINIC | Age: 45
End: 2025-04-08

## 2025-04-29 ENCOUNTER — OFFICE VISIT (OUTPATIENT)
Dept: URBAN - NONMETROPOLITAN AREA CLINIC 1 | Facility: CLINIC | Age: 45
End: 2025-04-29
Payer: COMMERCIAL

## 2025-04-29 DIAGNOSIS — K50.80 CROHN'S COLITIS: ICD-10-CM

## 2025-04-29 PROCEDURE — 96413 CHEMO IV INFUSION 1 HR: CPT | Performed by: INTERNAL MEDICINE

## 2025-04-29 RX ORDER — SACCHAROMYCES BOULARDII 250 MG
CAPSULE ORAL
Qty: 0 | Refills: 0 | Status: ACTIVE | COMMUNITY
Start: 1900-01-01

## 2025-04-29 RX ORDER — LISDEXAMFETAMINE DIMESYLATE 30 MG/1
CAPSULE ORAL
Qty: 0 | Refills: 0 | Status: ACTIVE | COMMUNITY
Start: 1900-01-01

## 2025-04-29 RX ORDER — NYSTATIN 100000 [USP'U]/G
APPLY TO AFFECTED AREA TWICE DAILY FOR 14DAYS POWDER TOPICAL
Qty: 480 GRAM | Refills: 1 | Status: ACTIVE | COMMUNITY

## 2025-04-29 RX ORDER — ACETAMINOPHEN 650 MG
2 TABLETS AS NEEDED TABLET, EXTENDED RELEASE ORAL
Status: ACTIVE | COMMUNITY
Start: 2025-03-10

## 2025-04-29 RX ORDER — VEDOLIZUMAB 300 MG/5ML
AS DIRECTED INJECTION, POWDER, LYOPHILIZED, FOR SOLUTION INTRAVENOUS
Status: ACTIVE | COMMUNITY
Start: 2025-03-10

## 2025-04-29 RX ORDER — VILAZODONE HYDROCHLORIDE 40 MG/1
TAKE 1 TABLET (40 MG) BY ORAL ROUTE ONCE DAILY WITH FOOD TABLET ORAL 1
Qty: 0 | Refills: 0 | Status: ACTIVE | COMMUNITY
Start: 1900-01-01

## 2025-06-03 ENCOUNTER — DASHBOARD ENCOUNTERS (OUTPATIENT)
Age: 45
End: 2025-06-03

## 2025-06-03 ENCOUNTER — OFFICE VISIT (OUTPATIENT)
Dept: URBAN - NONMETROPOLITAN AREA CLINIC 2 | Facility: CLINIC | Age: 45
End: 2025-06-03
Payer: COMMERCIAL

## 2025-06-03 DIAGNOSIS — K50.80 CROHN'S DISEASE OF BOTH SMALL AND LARGE INTESTINE WITHOUT COMPLICATION: ICD-10-CM

## 2025-06-03 DIAGNOSIS — K94.13 ILEOSTOMY DYSFUNCTION: ICD-10-CM

## 2025-06-03 DIAGNOSIS — R17 ELEVATED BILIRUBIN: ICD-10-CM

## 2025-06-03 PROCEDURE — 99214 OFFICE O/P EST MOD 30 MIN: CPT

## 2025-06-03 RX ORDER — ACETAMINOPHEN 650 MG
2 TABLETS AS NEEDED TABLET, EXTENDED RELEASE ORAL
Status: ACTIVE | COMMUNITY
Start: 2025-03-10

## 2025-06-03 RX ORDER — VILAZODONE HYDROCHLORIDE 40 MG/1
TAKE ONE TABLET BY MOUTH ONE TIME DAILY WITH FOOD TABLET, FILM COATED ORAL
Qty: 30 UNSPECIFIED | Refills: 0 | Status: ACTIVE | COMMUNITY

## 2025-06-03 RX ORDER — VEDOLIZUMAB 300 MG/5ML
AS DIRECTED INJECTION, POWDER, LYOPHILIZED, FOR SOLUTION INTRAVENOUS
Status: ACTIVE | COMMUNITY
Start: 2025-03-10

## 2025-06-03 NOTE — HPI-TODAY'S VISIT:
6/3/2025 Patient returns to clinic for follow-up for Crohn's disease.  His last visit was with Dr. Redd where he discussed the injectable option for Entyvio.  He is on Dupixent for eczema which controls his symptoms however he already feels uncomfortable with this injection and does not want to add another one.  He feels comfortable in the infusion center and tolerates his infusions well.  Today he would like to continue this as he has been doing.  He will review any further recommendations with Dr. Redd at his next visit.  For now he is stable from a Crohn's standpoint with no abdominal pain rectal pain increased stool or ostomy output.  He is not seeing any blood.  His weight is stable.  Will check his labs today and he will keep a follow-up in 6 months with Jyothi Pinon NP with a subsequent Dr. Redd appointment for follow-up. SP 8/5/2020 Kwesi presents for follow up of Crohn's disease s/p colectomy with end ileostomy. His output is good with no bleeding or pain. He is tolerating his entivyo Q 8 weeks. 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 Dr. Jurado has recommended a binder. He is loosing seal on his ostomy and has a rash at the stoma. He is due for lab work. Today he is doing well. MB   2/4/20 Mr. Finch presents for f/u of his Crohn's disease.  he is maintained on Entyvio and doing well.  His only complaint today is that his ostomy seems to be herniating again.  he has not followed up with Dr. Jurado.  He has not had any flares of his Crohn's.  OVerall, he is doing well today.  9/16/19 Kwesi presents for follow up of Crohn's disease s/p colectomy with end ilostomy. He recently fell while skating and developed a parastomal hernia. He had to have emergency surgery by Dr. Jurado. He is doing well postop with no complications, good output. He did loose weight during this and would like to meet with jian. Today he is doing well otherwise with no new GI complaints. MB /11/23 4/11/2023 The patient presents today for follow-up of his Crohn's disease status post colectomy with an ileostomy.  Since her last visit, he has been doing quite well.  He is on Entyvio every 8 weeks.  He denies any blood in his stool and his output.  He does have a small parastomal hernia, but it does not give him problems.  He denies any abdominal pain.  He is now on Dupixent for eczema and psoriasis, and this is well controlled.  Overall, from a GI standpoint, he is doing quite well.  He had his labs done recently which were within normal limits.  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  We will see him back in the office in 1 year.  4/9/24 The patient presents today for follow-up of his Crohn's disease status post colectomy with an ileostomy. Since her last visit, he has been doing quite well. He is on Entyvio every 8 weeks. He denies any blood in his stool and his output. He does have a small parastomal hernia, but it does not give him problems. He denies any abdominal pain. He is now on Dupixent for eczema and psoriasis, and this is well controlled. Overall, from a GI standpoint, he is doing quite well. He is due for repeat bloodwork.  We have discussed injectable entyvio, and he wants to consider this.   We will see him back in the office in 1 year.

## 2025-06-07 LAB
A/G RATIO: 1.7
ABSOLUTE BASOPHILS: 60
ABSOLUTE EOSINOPHILS: 660
ABSOLUTE LYMPHOCYTES: 2130
ABSOLUTE MONOCYTES: 518
ABSOLUTE NEUTROPHILS: 4133
ALBUMIN: 4.7
ALKALINE PHOSPHATASE: 48
ALT (SGPT): 17
AST (SGOT): 20
BASOPHILS: 0.8
BILIRUBIN, TOTAL: 1.9
BUN/CREATININE RATIO: (no result)
BUN: 13
C-REACTIVE PROTEIN, QUANT: <3
CALCIUM: 9.8
CARBON DIOXIDE, TOTAL: 31
CHLORIDE: 97
CREATININE: 0.92
EGFR: 103
EOSINOPHILS: 8.8
GLOBULIN, TOTAL: 2.8
GLUCOSE: 131
HBSAG SCREEN: (no result)
HEMATOCRIT: 46.4
HEMOGLOBIN: 15.6
HEP A AB, IGM: (no result)
HEP B CORE AB, IGM: (no result)
HEPATITIS C ANTIBODY: (no result)
LYMPHOCYTES: 28.4
MCH: 29.5
MCHC: 33.6
MCV: 87.7
MITOGEN-NIL: 8.23
MONOCYTES: 6.9
MPV: 9.5
NEUTROPHILS: 55.1
PLATELET COUNT: 263
POTASSIUM: 3.9
PROTEIN, TOTAL: 7.5
QUANTIFERON NIL VALUE: 0.02
QUANTIFERON TB1 AG VALUE: 0
QUANTIFERON TB2 AG VALUE: 0
QUANTIFERON-TB GOLD PLUS: NEGATIVE
RDW: 12.6
RED BLOOD CELL COUNT: 5.29
SED RATE BY MODIFIED: 2
SODIUM: 136
WHITE BLOOD CELL COUNT: 7.5

## 2025-07-01 ENCOUNTER — OFFICE VISIT (OUTPATIENT)
Dept: URBAN - NONMETROPOLITAN AREA CLINIC 1 | Facility: CLINIC | Age: 45
End: 2025-07-01

## 2025-08-11 ENCOUNTER — OFFICE VISIT (OUTPATIENT)
Dept: URBAN - NONMETROPOLITAN AREA CLINIC 1 | Facility: CLINIC | Age: 45
End: 2025-08-11
Payer: COMMERCIAL

## 2025-08-11 DIAGNOSIS — K50.80: ICD-10-CM

## 2025-08-11 PROCEDURE — 96413 CHEMO IV INFUSION 1 HR: CPT | Performed by: INTERNAL MEDICINE

## 2025-08-11 RX ORDER — ACETAMINOPHEN 650 MG
2 TABLETS AS NEEDED TABLET, EXTENDED RELEASE ORAL
Status: ACTIVE | COMMUNITY
Start: 2025-03-10

## 2025-08-11 RX ORDER — VILAZODONE HYDROCHLORIDE 40 MG/1
TAKE ONE TABLET BY MOUTH ONE TIME DAILY WITH FOOD TABLET, FILM COATED ORAL
Qty: 30 UNSPECIFIED | Refills: 0 | Status: ACTIVE | COMMUNITY

## 2025-08-11 RX ORDER — VEDOLIZUMAB 300 MG/5ML
AS DIRECTED INJECTION, POWDER, LYOPHILIZED, FOR SOLUTION INTRAVENOUS
Status: ACTIVE | COMMUNITY
Start: 2025-03-10

## 2025-08-26 ENCOUNTER — OFFICE VISIT (OUTPATIENT)
Dept: URBAN - NONMETROPOLITAN AREA CLINIC 1 | Facility: CLINIC | Age: 45
End: 2025-08-26